# Patient Record
Sex: FEMALE | Race: WHITE | NOT HISPANIC OR LATINO | Employment: FULL TIME | URBAN - METROPOLITAN AREA
[De-identification: names, ages, dates, MRNs, and addresses within clinical notes are randomized per-mention and may not be internally consistent; named-entity substitution may affect disease eponyms.]

---

## 2019-12-24 ENCOUNTER — COMMUNICATION - HEALTHEAST (OUTPATIENT)
Dept: SCHEDULING | Facility: CLINIC | Age: 29
End: 2019-12-24

## 2021-05-29 ENCOUNTER — RECORDS - HEALTHEAST (OUTPATIENT)
Dept: ADMINISTRATIVE | Facility: CLINIC | Age: 31
End: 2021-05-29

## 2021-06-04 NOTE — TELEPHONE ENCOUNTER
Call from pt       Status update - ankle injury      Seen at ED yesterday     ACE bandage and crutches and not to put any weight on it  - still with pain today but was wondering if can change form ACE to something else like a BOOT ?      She is scheduled to have MRI tomorrow to look further     A/P:   > No - would suggest cont use of ACE until cleared by provider to change to something else      > Cont with after care instructions otherwise from the ED     Aiden Vora RN   Triage and Medication Refills

## 2022-09-29 ENCOUNTER — HOSPITAL ENCOUNTER (EMERGENCY)
Facility: HOSPITAL | Age: 32
Discharge: HOME | End: 2022-09-29
Attending: EMERGENCY MEDICINE | Admitting: EMERGENCY MEDICINE
Payer: COMMERCIAL

## 2022-09-29 ENCOUNTER — APPOINTMENT (OUTPATIENT)
Dept: RADIOLOGY | Facility: HOSPITAL | Age: 32
End: 2022-09-29
Attending: EMERGENCY MEDICINE
Payer: COMMERCIAL

## 2022-09-29 VITALS
DIASTOLIC BLOOD PRESSURE: 77 MMHG | OXYGEN SATURATION: 98 % | RESPIRATION RATE: 18 BRPM | HEIGHT: 65 IN | SYSTOLIC BLOOD PRESSURE: 143 MMHG | BODY MASS INDEX: 41.65 KG/M2 | HEART RATE: 97 BPM | TEMPERATURE: 98.8 F | WEIGHT: 250 LBS

## 2022-09-29 DIAGNOSIS — S90.31XA CONTUSION OF RIGHT FOOT, INITIAL ENCOUNTER: ICD-10-CM

## 2022-09-29 PROCEDURE — 99283 EMERGENCY DEPT VISIT LOW MDM: CPT

## 2022-09-29 PROCEDURE — 250N000013 HC RX MED GY IP 250 OP 250 PS 637: Performed by: EMERGENCY MEDICINE

## 2022-09-29 PROCEDURE — 73630 X-RAY EXAM OF FOOT: CPT | Mod: RT

## 2022-09-29 RX ORDER — IBUPROFEN 600 MG/1
600 TABLET, FILM COATED ORAL ONCE
Status: COMPLETED | OUTPATIENT
Start: 2022-09-29 | End: 2022-09-29

## 2022-09-29 RX ADMIN — IBUPROFEN 600 MG: 600 TABLET, FILM COATED ORAL at 19:51

## 2022-09-30 NOTE — DISCHARGE INSTRUCTIONS
Fortunately the x-ray is negative for fracture.  This will be sore for several days with bruising, but should gradually improve.  Use ice, ibuprofen with breakfast lunch and dinner.

## 2022-09-30 NOTE — ED PROVIDER NOTES
EMERGENCY DEPARTMENT ENCOUNTER     NAME: Consuelo Sanders   AGE: 32 year old female   YOB: 1990   MRN: [id number]   EVALUATION DATE & TIME: No admission date for patient encounter.   PCP: No primary care provider on file.     Chief Complaint   Patient presents with     Foot Pain   :    FINAL IMPRESSION       1. Contusion of right foot, initial encounter           ED COURSE & MEDICAL DECISION MAKING      7:33 PM I met with patient for initial interview and encounter. PPE worn includes surgical mask.      Pertinent Labs & Imaging studies reviewed. (See chart for details)   32 year old female  presents to the Emergency Department for evaluation of foot injury. Initial Vitals Reviewed. Initial exam notable for well-appearing patient who has some ecchymosis and tenderness over the right dorsal midfoot.  I suspect foot contusion but also considered fracture, dislocation.  She was treated with anti-inflammatories and ice, x-ray was obtained and is negative for fracture or dislocation.  We discussed compression and other rice instructions and she is comfortable with discharge.           At the conclusion of the encounter I discussed the results of all of the tests and the disposition. The questions were answered. The patient or family acknowledged understanding and was agreeable with the care plan.         MEDICATIONS GIVEN IN THE EMERGENCY:   Medications   ibuprofen (ADVIL/MOTRIN) tablet 600 mg (has no administration in time range)      NEW PRESCRIPTIONS STARTED AT TODAY'S ER VISIT   New Prescriptions    No medications on file     ================================================================   HISTORY OF PRESENT ILLNESS       Patient information was obtained from: Patient    Use of Intrepreter: N/A    Consuelo Sanders is a 32 year old female with no history who presents to ED via walk in for evaluation of foot pain.     Patient presents with right foot pain after a marble counter that was leaning against  "the door fell on her foot. Patient is able to ambulate and CMS intact. Patient endorses bruising, swelling and pain. She reports not taking anything for pain yet. Patient denies any other complaints or concerns.     ================================================================    REVIEW OF SYSTEMS       Review of Systems   Musculoskeletal:        Positive for top right foot pain.   All other systems reviewed and are negative.        PAST HISTORY     PAST MEDICAL HISTORY:   History reviewed. No pertinent past medical history.   PAST SURGICAL HISTORY:   History reviewed. No pertinent surgical history.   CURRENT MEDICATIONS:   No current outpatient medications on file.    ALLERGIES:   Allergies   Allergen Reactions     Penicillins Hives      FAMILY HISTORY:   No family history on file.   SOCIAL HISTORY:   Social History     Socioeconomic History     Marital status: Single        VITALS  Patient Vitals for the past 24 hrs:   BP Temp Temp src Pulse Resp SpO2 Height Weight   09/29/22 1918 (!) 143/77 98.8  F (37.1  C) Temporal 97 20 98 % 1.651 m (5' 5\") 113.4 kg (250 lb)        ================================================================    PHYSICAL EXAM     VITAL SIGNS: BP (!) 143/77   Pulse 97   Temp 98.8  F (37.1  C) (Temporal)   Resp 20   Ht 1.651 m (5' 5\")   Wt 113.4 kg (250 lb)   SpO2 98%   BMI 41.60 kg/m     Constitutional:  Awake, no acute distress   HENT:  Atraumatic, oropharynx without exudate or erythema, membranes moist  Lymph:  No adenopathy  Eyes: EOM intact, PERRL, no injection  Neck: Supple  Respiratory:  Clear to auscultation bilaterally, no wheezes or crackles   Cardiovascular:  Regular rate and rhythm, single S1 and S2   GI:  Soft, nontender, nondistended, no rebound or guarding   Musculoskeletal:  Moves all extremities, no deformities. Ecchymosis edema over right dorsal foot.   Skin:  Warm, dry  Neurologic:  Alert and oriented x3, no focal deficits noted "       ================================================================  LAB       All pertinent labs reviewed and interpreted.   Labs Ordered and Resulted from Time of ED Arrival to Time of ED Departure - No data to display     ===============================================================  RADIOLOGY       Reviewed all pertinent imaging. Please see official radiology report.   Foot  XR, G/E 3 views, right   Final Result   IMPRESSION: Normal joint spaces and alignment. No fracture. Small plantar calcaneal spur.            ================================================================  EKG         I have independently reviewed and interpreted the EKG(s) documented above.     ================================================================  PROCEDURES         I, Aleksandra Her, am serving as a scribe to document services personally performed by Dr. Jones based on my observation and the provider's statements to me. I, Anastasiia Jones MD attest that Aleksandra Her is acting in a scribe capacity, has observed my performance of the services and has documented them in accordance with my direction.     Anastasiia Jones M.D.   Emergency Medicine   [facility] EMERGENCY DEPARTMENT  [address] [place] [id number]  [phone]  Dept: [phone]         Anastasiia Jones MD  09/29/22 2031

## 2022-09-30 NOTE — ED TRIAGE NOTES
Pt states that a marble counter that was leaning against a door, fell over onto her right foot. Pt is able to ambulate and CMS intact. Pt endorses bruising, swelling, and pain.      Triage Assessment     Row Name 09/29/22 1920       Triage Assessment (Adult)    Airway WDL WDL       Respiratory WDL    Respiratory WDL WDL       Skin Circulation/Temperature WDL    Skin Circulation/Temperature WDL WDL       Cardiac WDL    Cardiac WDL WDL       Peripheral/Neurovascular WDL    Peripheral Neurovascular WDL WDL       Cognitive/Neuro/Behavioral WDL    Cognitive/Neuro/Behavioral WDL WDL

## 2022-12-13 ENCOUNTER — VIRTUAL VISIT (OUTPATIENT)
Dept: PEDIATRICS | Facility: CLINIC | Age: 32
End: 2022-12-13
Payer: COMMERCIAL

## 2022-12-13 DIAGNOSIS — U07.1 INFECTION DUE TO 2019 NOVEL CORONAVIRUS: Primary | ICD-10-CM

## 2022-12-13 PROCEDURE — 99203 OFFICE O/P NEW LOW 30 MIN: CPT | Mod: CS | Performed by: INTERNAL MEDICINE

## 2022-12-13 NOTE — PROGRESS NOTES
Zully is a 32 year old who is being evaluated via a billable telephone visit.      Distant Location (provider location):  Off-site    Assessment & Plan       ICD-10-CM    1. Infection due to 2019 novel coronavirus  U07.1 nirmatrelvir and ritonavir (PAXLOVID) therapy pack        COVID infection  Meets treatment criteria  Reviewed options  Rx for Paxlovid sent to [facility] pharmacy  Reviewed drug interaction potential  Reviewed sx for which she should be re-evaluated    Lane Yepez MD  [facility] SUSAN    Nanda Andrea is a 32 year old, presenting for the following health issues:    HPI       COVID-19 Symptom Review  How many days ago did these symptoms start? 3 days, tested positive 3 days ago    Are any of the following symptoms significant for you?    New or worsening difficulty breathing? No    Worsening cough? Yes, I am coughing up mucus.    Fever or chills? Yes, I felt feverish or had chills.    Headache: No    Sore throat: YES    Chest pain: No    Diarrhea: No    Body aches? YES          Objective         Vitals:  No vitals were obtained today due to virtual visit.    Physical Exam   GEN: No distress  PSYCH: Alert, affect is normal  RESP: Intermittent cough, no audible wheezing, able to talk in full sentences  Remainder of exam unable to be completed due to telephone visit          Phone call duration: 8 minutes

## 2022-12-27 ENCOUNTER — NURSE TRIAGE (OUTPATIENT)
Dept: NURSING | Facility: CLINIC | Age: 32
End: 2022-12-27

## 2022-12-28 ENCOUNTER — ANCILLARY PROCEDURE (OUTPATIENT)
Dept: GENERAL RADIOLOGY | Facility: CLINIC | Age: 32
End: 2022-12-28
Attending: FAMILY MEDICINE
Payer: COMMERCIAL

## 2022-12-28 ENCOUNTER — OFFICE VISIT (OUTPATIENT)
Dept: FAMILY MEDICINE | Facility: CLINIC | Age: 32
End: 2022-12-28
Payer: COMMERCIAL

## 2022-12-28 VITALS
HEART RATE: 84 BPM | DIASTOLIC BLOOD PRESSURE: 72 MMHG | OXYGEN SATURATION: 98 % | WEIGHT: 293 LBS | RESPIRATION RATE: 14 BRPM | BODY MASS INDEX: 60.24 KG/M2 | SYSTOLIC BLOOD PRESSURE: 138 MMHG

## 2022-12-28 DIAGNOSIS — R05.2 SUBACUTE COUGH: ICD-10-CM

## 2022-12-28 DIAGNOSIS — U07.1 INFECTION DUE TO 2019 NOVEL CORONAVIRUS: ICD-10-CM

## 2022-12-28 DIAGNOSIS — R05.2 SUBACUTE COUGH: Primary | ICD-10-CM

## 2022-12-28 PROCEDURE — 71046 X-RAY EXAM CHEST 2 VIEWS: CPT | Mod: TC | Performed by: RADIOLOGY

## 2022-12-28 PROCEDURE — 99203 OFFICE O/P NEW LOW 30 MIN: CPT | Performed by: FAMILY MEDICINE

## 2022-12-28 RX ORDER — PREDNISONE 20 MG/1
TABLET ORAL
Qty: 14 TABLET | Refills: 0 | Status: SHIPPED | OUTPATIENT
Start: 2022-12-28

## 2022-12-28 NOTE — PROGRESS NOTES
"  Assessment & Plan     Subacute cough  Infection due to 2019 novel coronavirus  Discussed with patient that her examination and chest x-ray are consistent with continued bronchial inflammation after COVID.  We will attempt to treat this with a taper of steroids.  Chest x-ray appears clear today.  She can continue over-the-counter cough medicines as well.  Follow-up as needed.  - predniSONE (DELTASONE) 20 MG tablet; Take 2 tabs daily for 4 days, 1 tab daily for 4 days, then 0.5 tabs daily for 4 days  - XR Chest 2 Views; Future             BMI:   Estimated body mass index is 60.24 kg/m  as calculated from the following:    Height as of 9/29/22: 1.651 m (5' 5\").    Weight as of this encounter: 164.2 kg (362 lb).           Return in about 1 week (around 1/4/2023), or if symptoms worsen or fail to improve.    Val Mcelroy MD  [facility] JAY Andrea is a 32 year old, presenting for the following health issues:  Cough (18 days ago tested positive for Covid - still has cough. Cough gets worse in the evening. )      History of Present Illness       Reason for visit:  Cough lingering for 18 days  Symptom onset:  3-4 weeks ago  Symptoms include:  Cough  Symptom intensity:  Moderate  Symptom progression:  Staying the same  Had these symptoms before:  No  What makes it worse:  Talking a lot  What makes it better:  Sleep    Zully Sanders eats 2-3 servings of fruits and vegetables daily.Zully Sanders consumes 1 sweetened beverage(s) daily.Zully Sanders exercises with enough effort to increase Zully Sanders's heart rate 9 or less minutes per day.  Zully Sanders exercises with enough effort to increase Zully Sanders's heart rate 4 days per week.   Zully Sanders is taking medications regularly.     Patient presents today for evaluation of continued cough since previously having COVID.  She states that her symptoms started around 12/10 with a sore throat.  She started coughing shortly " thereafter.  She did have a fever for quite some time, but this ended about 1 week ago.  She denies facial pain or pressure, denies ongoing congestion or ear pain.  Overall she feels pretty well with the exception of a nagging cough.  This is primarily a dry cough.  It is worse at night, worse with talking or laughing.  She has never been diagnosed with asthma in the past, has never needed an inhaler or nebulizer.  She is a non-smoker.    Review of Systems   Constitutional, HEENT, cardiovascular, pulmonary, gi and gu systems are negative, except as otherwise noted.      Objective    /72   Pulse 84   Resp 14   Wt (!) 164.2 kg (362 lb)   LMP 12/02/2022 (Approximate)   SpO2 98%   BMI 60.24 kg/m    Body mass index is 60.24 kg/m .  Physical Exam   GENERAL: healthy, alert and no distress  EYES: Eyes grossly normal to inspection, PERRL and conjunctivae and sclerae normal  HENT: ear canals and TM's normal, nose and mouth without ulcers or lesions  NECK: no adenopathy, no asymmetry, masses, or scars and thyroid normal to palpation  RESP: lungs clear to auscultation - no rales, rhonchi or wheezes; frequent dry cough during the visit  CV: regular rate and rhythm, normal S1 S2, no S3 or S4, no murmur, click or rub, no peripheral edema and peripheral pulses strong  NEURO: Normal strength and tone, mentation intact and speech normal  PSYCH: mentation appears normal, affect normal/bright    Results for orders placed or performed in visit on 12/28/22   XR Chest 2 Views     Status: None    Narrative    EXAM: XR CHEST 2 VIEWS  LOCATION: [facility]  DATE/TIME: 12/28/2022 9:00 AM    INDICATION: Residual cough more than 2 weeks after COVID, nonproductive  COMPARISON: None.      Impression    IMPRESSION: The heart size and cardiomediastinal silhouette appear normal. The lungs appear clear. Mild elevation of the right hemidiaphragm anteriorly. No significant bone abnormalities.

## 2022-12-28 NOTE — TELEPHONE ENCOUNTER
Nurse Triage SBAR    Is this a 2nd Level Triage? 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 Taking Nyquil and is not helping. No increase in shortness of breath, but states she feels worse.     Protocol Recommended Disposition: Be seen within three days, writer feels patient should be seen sooner, will transfer to scheduling but explained if no appointment should go to urgent care of ED in the next 24 hours.  Susan Welsh RN on 12/27/2022 at 8:13 PM      Reason for Disposition    [1] Chest pain, pressure, or tightness AND [2] new-onset or worsening    [1] Chest pain(s) lasting a few seconds from coughing AND [2] persists > 3 days    Additional Information    Negative: SEVERE difficulty breathing (e.g., struggling for each breath, speaks in single words)    Negative: [1] SEVERE weakness (e.g., can't stand or can barely walk) AND [2] new-onset or WORSE    Negative: Difficult to awaken or acting confused (e.g., disoriented, slurred speech)    Negative: Bluish (or gray) lips or face now    Negative: Sounds like a life-threatening emergency to the triager    Negative: [1] Typical COVID-19 symptoms AND [2] lasting less than 3 weeks    Negative: SEVERE difficulty breathing (e.g., struggling for each breath, speaks in single words)    Negative: Difficult to awaken or acting confused (e.g., disoriented, slurred speech)    Negative: Shock suspected (e.g., cold/pale/clammy skin, too weak to stand, low BP, rapid pulse)    Negative: Passed out (i.e., lost consciousness, collapsed and was not responding)    Negative: [1] Chest pain lasts > 5 minutes AND [2] age > 44    Negative: [1] Chest pain lasts > 5 minutes AND [2] age > 30 AND [3] one or more cardiac risk factors (e.g.,  "diabetes, high blood pressure, high cholesterol, smoker, or strong family history of heart disease)    Negative: [1] Chest pain lasts > 5 minutes AND [2] history of heart disease (i.e., angina, heart attack, heart failure, bypass surgery, takes nitroglycerin)    Negative: [1] Chest pain lasts > 5 minutes AND [2] described as crushing, pressure-like, or heavy    Negative: Heart beating < 50 beats per minute OR > 140 beats per minute    Negative: Visible sweat on face or sweat dripping down face    Negative: Sounds like a life-threatening emergency to the triager    Negative: Followed a chest injury    Negative: SEVERE chest pain    Negative: [1] Chest pain (or \"angina\") comes and goes AND [2] is happening more often (increasing in frequency) or getting worse (increasing in severity) (Exception: chest pains that last only a few seconds)    Negative: Pain also in shoulder(s) or arm(s) or jaw (Exception: pain is clearly made worse by movement)    Negative: Difficulty breathing    Negative: Dizziness or lightheadedness    Negative: [1] Chest pain lasting < 5 minutes AND [2] has not taken prescribed nitroglycerin    Negative: [1] Chest pain lasting < 5 minutes AND [2] completely gone after taking nitroglycerin    Negative: [1] Chest pain from known angina comes and goes AND [2] is NOT happening more often (increasing in frequency) or getting worse (increasing in severity)    Protocols used: CORONAVIRUS (COVID-19) PERSISTING SYMPTOMS FOLLOW-UP CALL-A- 1.18.2022, CHEST PAIN-A-AH      "

## 2023-02-04 ENCOUNTER — HEALTH MAINTENANCE LETTER (OUTPATIENT)
Age: 33
End: 2023-02-04

## 2024-03-02 ENCOUNTER — HEALTH MAINTENANCE LETTER (OUTPATIENT)
Age: 34
End: 2024-03-02

## 2024-12-12 ENCOUNTER — THERAPY VISIT (OUTPATIENT)
Dept: PHYSICAL THERAPY | Facility: CLINIC | Age: 34
End: 2024-12-12
Payer: COMMERCIAL

## 2024-12-12 DIAGNOSIS — M25.572 PAIN IN JOINT INVOLVING ANKLE AND FOOT, LEFT: Primary | ICD-10-CM

## 2024-12-12 ASSESSMENT — ACTIVITIES OF DAILY LIVING (ADL)
ANY_OF_YOUR_USUAL_WORK,_HOUSEWORK_OR_SCHOOL_ACTIVITIES: MODERATE DIFFICULTY
GETTING_INTO_OR_OUT_OF_A_CAR: A LITTLE BIT OF DIFFICULTY
PLEASE_INDICATE_YOR_PRIMARY_REASON_FOR_REFERRAL_TO_THERAPY:: FOOT AND/OR ANKLE
SQUATTING: QUITE A BIT OF DIFFICULTY
SHOPPING: MODERATE DIFFICULTY
ROLLING_OVER_IN_BED: NO DIFFICULTY
STANDING_FOR_1_HOUR: QUITE A BIT OF DIFFICULTY
PERFORMING_LIGHT_ACTIVITIES_AROUND_YOUR_HOME: NO DIFFICULTY
RUNNING_ON_UNEVEN_GROUND: QUITE A BIT OF DIFFICULTY
RUNNING_ON_EVEN_GROUND: QUITE A BIT OF DIFFICULTY
LEFS_SCORE(%): 0
LIFTING_AN_OBJECT,_LIKE_A_BAG_OF_GROCERIES_FROM_THE_FLOOR: A LITTLE BIT OF DIFFICULTY
MAKING_SHARP_TURNS_WHILE_RUNNING_FAST: QUITE A BIT OF DIFFICULTY
WALKING_BETWEEN_ROOMS: NO DIFFICULTY
YOUR_USUAL_HOBBIES,_RECREATIONAL_OR_SPORTING_ACTIVITIES: QUITE A BIT OF DIFFICULTY
SITTING_FOR_1_HOUR: NO DIFFICULTY
GOING_UP_OR_DOWN_10_STAIRS: MODERATE DIFFICULTY
PUTTING_ON_YOUR_SHOES_OR_SOCKS: MODERATE DIFFICULTY
WALKING_A_MILE: QUITE A BIT OF DIFFICULTY
LEFS_RAW_SCORE: 0
WALKING_2_BLOCKS: MODERATE DIFFICULTY
PERFORMING_HEAVY_ACTIVITIES_AROUND_YOUR_HOME: MODERATE DIFFICULTY
GETTING_INTO_AND_OUT_OF_A_BATH: MODERATE DIFFICULTY

## 2024-12-12 NOTE — PROGRESS NOTES
PHYSICAL THERAPY EVALUATION  Type of Visit: Evaluation              Subjective         Presenting condition or subjective complaint: (Patient-Rptd) ankle pain from a surgery. Pt presents to PT to discuss delayed rehab for the L ankle after an ankle arthroscopy, ligament reconstruction in 01/2020. Pt reports having a fall down the stairs while outside in 12/2019 with a reported severe ankle sprain with tendon tears requiring a lateral ankle ligament reconstruction surgery (microfracture, syndesmosis repair, lateral ligament reconstruction) in 01/2020. Pt reports being in a cast for several weeks and then placed in a CAM boot for several weeks and unfortunately did not attend PT due to COVID restrictions. Pt reports a persistent limp since surgery and frustrated with overall ankle function.    Pain worse with prolonged standing, prolonged walking, stairs, squatting. Pt has complaints of ankle tightness, weakness, swelling, but also reports a decreased positional sense of their ankle.     Date of onset: 01/01/20    Relevant medical history: (Patient-Rptd) Depression; Mental Illness; Overweight; Vision problems   Dates & types of surgery: (Patient-Rptd) ankle surgey - jan 2020    Prior diagnostic imaging/testing results: (Patient-Rptd) CT scan; X-ray     Prior therapy history for the same diagnosis, illness or injury:          Living Environment  Social support: (Patient-Rptd) With a significant other or spouse   Type of home: (Patient-Rptd) House   Stairs to enter the home: (Patient-Rptd) Yes       Ramp: (Patient-Rptd) No   Stairs inside the home: (Patient-Rptd) Yes (Patient-Rptd) 8 Is there a railing: (Patient-Rptd) Yes     Help at home: (Patient-Rptd) None  Equipment owned:       Employment: (Patient-Rptd) Yes (Patient-Rptd)   Hobbies/Interests: (Patient-Rptd) art, time with friends, reading    Patient goals for therapy: (Patient-Rptd) movement without a limp is really difficult    Pain assessment: See  objective evaluation for additional pain details     Objective   FOOT/ANKLE EVALUATION  PAIN: Pain Level at Rest: 1/10  Pain Level with Use: 4/10  Pain Location: L lateral ankle  Pain Quality: stiff and achiness  Pain Frequency: intermittent  Pain is Worst: evening or after activity  Pain is Exacerbated By: walking, standing, stairs, squatting  Pain is Relieved By: none  Pain Progression: Unchanged  INTEGUMENTARY (edema, incisions):   POSTURE:   GAIT:   Weightbearing Status: WBAT  Assistive Device(s): None  Gait Deviations: Antalgic  L toe out, lack of push off  BALANCE/PROPRIOCEPTION:   WEIGHT BEARING ALIGNMENT:   NON-WEIGHTBEARING ALIGNMENT:    ROM:     STRENGTH:   Pain: - none + mild ++ moderate +++ severe  Strength Scale: 0-5/5 Left Right   Ankle Dorsiflexion 5 5   Ankle Plantarflexion     Ankle Inversion 4+ 5   Ankle Eversion 4+ 5   Great Toe Flexion     Great Toe Extension     Anterior Tibialis     Posterior Tibialis     Peroneals     Extensor Digitorum     Gastroc/Soleus       FLEXIBILITY: Decreased gastroc L  SPECIAL TESTS:   FUNCTIONAL TESTS: Double Leg Squat: Improper use of glutes/hips and significant loss of depth  PALPATION:  TTP at L peroneals  JOINT MOBILITY:  hypomobile distal tib fib and talocrural      Assessment & Plan   CLINICAL IMPRESSIONS  Medical Diagnosis: L ankle pain s/p L ankle arthroscopy    Treatment Diagnosis: L ankle pain s/p L ankle arthroscopy   Impression/Assessment: Patient is a 34 year old adult with L ankle complaints.  The following significant findings have been identified: Pain, Decreased ROM/flexibility, Decreased joint mobility, Decreased strength, Impaired gait, and Impaired muscle performance. These impairments interfere with their ability to perform self care tasks, work tasks, recreational activities, household chores, and driving  as compared to previous level of function.     Clinical Decision Making (Complexity):  Clinical Presentation: Stable/Uncomplicated  Clinical  Presentation Rationale: based on medical and personal factors listed in PT evaluation  Clinical Decision Making (Complexity): Low complexity    PLAN OF CARE  Treatment Interventions:  Interventions: Gait Training, Manual Therapy, Neuromuscular Re-education, Therapeutic Activity, Therapeutic Exercise    Long Term Goals     PT Goal 1  Goal Identifier: Walking  Goal Description: Pt will be able to walk >1 mile w/o increased ankle pain  Rationale: to maximize safety and independence within the community;to maximize safety and independence with transportation  Goal Progress: pain 4/10 w/ walking  Target Date: 02/20/25      Frequency of Treatment: 1x week  Duration of Treatment: 6 weeks then 2 x month for 1 month    Recommended Referrals to Other Professionals:   Education Assessment:   Learner/Method: Patient;No Barriers to Learning    Risks and benefits of evaluation/treatment have been explained.   Patient/Family/caregiver agrees with Plan of Care.     Evaluation Time:     PT Eval, Low Complexity Minutes (90307): 20       Signing Clinician: Justo Sarmiento PT